# Patient Record
Sex: MALE | Race: WHITE | NOT HISPANIC OR LATINO | Employment: PART TIME | URBAN - METROPOLITAN AREA
[De-identification: names, ages, dates, MRNs, and addresses within clinical notes are randomized per-mention and may not be internally consistent; named-entity substitution may affect disease eponyms.]

---

## 2020-11-25 ENCOUNTER — HOSPITAL ENCOUNTER (OUTPATIENT)
Dept: LAB | Facility: MEDICAL CENTER | Age: 60
End: 2020-11-25
Attending: ANESTHESIOLOGY
Payer: OTHER MISCELLANEOUS

## 2020-11-25 PROCEDURE — C9803 HOPD COVID-19 SPEC COLLECT: HCPCS

## 2020-11-25 PROCEDURE — U0003 INFECTIOUS AGENT DETECTION BY NUCLEIC ACID (DNA OR RNA); SEVERE ACUTE RESPIRATORY SYNDROME CORONAVIRUS 2 (SARS-COV-2) (CORONAVIRUS DISEASE [COVID-19]), AMPLIFIED PROBE TECHNIQUE, MAKING USE OF HIGH THROUGHPUT TECHNOLOGIES AS DESCRIBED BY CMS-2020-01-R: HCPCS

## 2020-11-26 LAB — COVID ORDER STATUS COVID19: NORMAL

## 2020-11-27 LAB
SARS-COV-2 RNA RESP QL NAA+PROBE: NOTDETECTED
SPECIMEN SOURCE: NORMAL

## 2021-11-17 PROBLEM — S46.012A TRAUMATIC ROTATOR CUFF TEAR, LEFT, INITIAL ENCOUNTER: Status: ACTIVE | Noted: 2021-11-17

## 2022-01-06 ENCOUNTER — HOSPITAL ENCOUNTER (OUTPATIENT)
Facility: MEDICAL CENTER | Age: 62
End: 2022-01-06
Attending: ANESTHESIOLOGY
Payer: COMMERCIAL

## 2022-01-06 PROCEDURE — U0003 INFECTIOUS AGENT DETECTION BY NUCLEIC ACID (DNA OR RNA); SEVERE ACUTE RESPIRATORY SYNDROME CORONAVIRUS 2 (SARS-COV-2) (CORONAVIRUS DISEASE [COVID-19]), AMPLIFIED PROBE TECHNIQUE, MAKING USE OF HIGH THROUGHPUT TECHNOLOGIES AS DESCRIBED BY CMS-2020-01-R: HCPCS

## 2022-01-06 PROCEDURE — U0005 INFEC AGEN DETEC AMPLI PROBE: HCPCS

## 2022-01-07 LAB
COVID ORDER STATUS COVID19: NORMAL
SARS-COV-2 RNA RESP QL NAA+PROBE: NOTDETECTED
SPECIMEN SOURCE: NORMAL

## 2022-03-22 ENCOUNTER — TELEPHONE (OUTPATIENT)
Dept: SCHEDULING | Facility: IMAGING CENTER | Age: 62
End: 2022-03-22

## 2022-03-22 SDOH — ECONOMIC STABILITY: FOOD INSECURITY: WITHIN THE PAST 12 MONTHS, THE FOOD YOU BOUGHT JUST DIDN'T LAST AND YOU DIDN'T HAVE MONEY TO GET MORE.: NEVER TRUE

## 2022-03-22 SDOH — ECONOMIC STABILITY: INCOME INSECURITY: IN THE LAST 12 MONTHS, WAS THERE A TIME WHEN YOU WERE NOT ABLE TO PAY THE MORTGAGE OR RENT ON TIME?: NO

## 2022-03-22 SDOH — ECONOMIC STABILITY: TRANSPORTATION INSECURITY
IN THE PAST 12 MONTHS, HAS THE LACK OF TRANSPORTATION KEPT YOU FROM MEDICAL APPOINTMENTS OR FROM GETTING MEDICATIONS?: NO

## 2022-03-22 SDOH — ECONOMIC STABILITY: INCOME INSECURITY: HOW HARD IS IT FOR YOU TO PAY FOR THE VERY BASICS LIKE FOOD, HOUSING, MEDICAL CARE, AND HEATING?: NOT HARD AT ALL

## 2022-03-22 SDOH — HEALTH STABILITY: PHYSICAL HEALTH: ON AVERAGE, HOW MANY DAYS PER WEEK DO YOU ENGAGE IN MODERATE TO STRENUOUS EXERCISE (LIKE A BRISK WALK)?: 5 DAYS

## 2022-03-22 SDOH — ECONOMIC STABILITY: FOOD INSECURITY: WITHIN THE PAST 12 MONTHS, YOU WORRIED THAT YOUR FOOD WOULD RUN OUT BEFORE YOU GOT MONEY TO BUY MORE.: NEVER TRUE

## 2022-03-22 SDOH — ECONOMIC STABILITY: HOUSING INSECURITY
IN THE LAST 12 MONTHS, WAS THERE A TIME WHEN YOU DID NOT HAVE A STEADY PLACE TO SLEEP OR SLEPT IN A SHELTER (INCLUDING NOW)?: NO

## 2022-03-22 SDOH — HEALTH STABILITY: PHYSICAL HEALTH: ON AVERAGE, HOW MANY MINUTES DO YOU ENGAGE IN EXERCISE AT THIS LEVEL?: 60 MIN

## 2022-03-22 SDOH — ECONOMIC STABILITY: TRANSPORTATION INSECURITY
IN THE PAST 12 MONTHS, HAS LACK OF TRANSPORTATION KEPT YOU FROM MEETINGS, WORK, OR FROM GETTING THINGS NEEDED FOR DAILY LIVING?: NO

## 2022-03-22 SDOH — ECONOMIC STABILITY: TRANSPORTATION INSECURITY
IN THE PAST 12 MONTHS, HAS LACK OF RELIABLE TRANSPORTATION KEPT YOU FROM MEDICAL APPOINTMENTS, MEETINGS, WORK OR FROM GETTING THINGS NEEDED FOR DAILY LIVING?: NO

## 2022-03-22 SDOH — ECONOMIC STABILITY: HOUSING INSECURITY

## 2022-03-22 SDOH — HEALTH STABILITY: MENTAL HEALTH
STRESS IS WHEN SOMEONE FEELS TENSE, NERVOUS, ANXIOUS, OR CAN'T SLEEP AT NIGHT BECAUSE THEIR MIND IS TROUBLED. HOW STRESSED ARE YOU?: NOT AT ALL

## 2022-03-22 ASSESSMENT — SOCIAL DETERMINANTS OF HEALTH (SDOH)
HOW OFTEN DO YOU ATTEND CHURCH OR RELIGIOUS SERVICES?: NEVER
WITHIN THE PAST 12 MONTHS, YOU WORRIED THAT YOUR FOOD WOULD RUN OUT BEFORE YOU GOT THE MONEY TO BUY MORE: NEVER TRUE
IN A TYPICAL WEEK, HOW MANY TIMES DO YOU TALK ON THE PHONE WITH FAMILY, FRIENDS, OR NEIGHBORS?: THREE TIMES A WEEK
HOW HARD IS IT FOR YOU TO PAY FOR THE VERY BASICS LIKE FOOD, HOUSING, MEDICAL CARE, AND HEATING?: NOT HARD AT ALL
DO YOU BELONG TO ANY CLUBS OR ORGANIZATIONS SUCH AS CHURCH GROUPS UNIONS, FRATERNAL OR ATHLETIC GROUPS, OR SCHOOL GROUPS?: NO
HOW OFTEN DO YOU HAVE A DRINK CONTAINING ALCOHOL: NEVER
HOW OFTEN DO YOU GET TOGETHER WITH FRIENDS OR RELATIVES?: ONCE A WEEK
IN A TYPICAL WEEK, HOW MANY TIMES DO YOU TALK ON THE PHONE WITH FAMILY, FRIENDS, OR NEIGHBORS?: THREE TIMES A WEEK
DO YOU BELONG TO ANY CLUBS OR ORGANIZATIONS SUCH AS CHURCH GROUPS UNIONS, FRATERNAL OR ATHLETIC GROUPS, OR SCHOOL GROUPS?: NO
HOW OFTEN DO YOU GET TOGETHER WITH FRIENDS OR RELATIVES?: ONCE A WEEK
HOW OFTEN DO YOU ATTEND CHURCH OR RELIGIOUS SERVICES?: NEVER

## 2022-03-22 ASSESSMENT — LIFESTYLE VARIABLES: HOW OFTEN DO YOU HAVE A DRINK CONTAINING ALCOHOL: NEVER

## 2022-03-23 ENCOUNTER — OFFICE VISIT (OUTPATIENT)
Dept: MEDICAL GROUP | Facility: PHYSICIAN GROUP | Age: 62
End: 2022-03-23
Payer: COMMERCIAL

## 2022-03-23 VITALS
HEIGHT: 68 IN | WEIGHT: 153.6 LBS | BODY MASS INDEX: 23.28 KG/M2 | SYSTOLIC BLOOD PRESSURE: 132 MMHG | DIASTOLIC BLOOD PRESSURE: 76 MMHG | RESPIRATION RATE: 18 BRPM | OXYGEN SATURATION: 95 % | HEART RATE: 81 BPM | TEMPERATURE: 99 F

## 2022-03-23 DIAGNOSIS — Z87.898 HISTORY OF ELEVATED PROSTATE SPECIFIC ANTIGEN (PSA): ICD-10-CM

## 2022-03-23 DIAGNOSIS — J45.20 MILD INTERMITTENT ASTHMA WITHOUT COMPLICATION: ICD-10-CM

## 2022-03-23 DIAGNOSIS — R01.1 MURMUR, HEART: ICD-10-CM

## 2022-03-23 DIAGNOSIS — Z12.12 SCREENING FOR COLORECTAL CANCER: ICD-10-CM

## 2022-03-23 DIAGNOSIS — Z12.11 SCREENING FOR COLORECTAL CANCER: ICD-10-CM

## 2022-03-23 DIAGNOSIS — Z00.00 PHYSICAL EXAM, ANNUAL: ICD-10-CM

## 2022-03-23 DIAGNOSIS — J30.2 SEASONAL ALLERGIES: ICD-10-CM

## 2022-03-23 PROBLEM — K40.90 INGUINAL HERNIA: Status: ACTIVE | Noted: 2022-03-23

## 2022-03-23 PROBLEM — K40.90 INGUINAL HERNIA: Status: RESOLVED | Noted: 2022-03-23 | Resolved: 2022-03-23

## 2022-03-23 PROBLEM — Z98.890 S/P RIGHT ROTATOR CUFF REPAIR: Status: ACTIVE | Noted: 2021-11-17

## 2022-03-23 PROCEDURE — 99203 OFFICE O/P NEW LOW 30 MIN: CPT | Performed by: NURSE PRACTITIONER

## 2022-03-23 RX ORDER — CHOLECALCIFEROL (VITAMIN D3) 50 MCG
TABLET ORAL
COMMUNITY
End: 2022-03-23

## 2022-03-23 RX ORDER — CHOLECALCIFEROL (VITAMIN D3) 125 MCG
5000 CAPSULE ORAL DAILY
COMMUNITY

## 2022-03-23 RX ORDER — MULTIVIT WITH MINERALS/LUTEIN
TABLET ORAL
COMMUNITY

## 2022-03-23 ASSESSMENT — ENCOUNTER SYMPTOMS
HEADACHES: 0
DIZZINESS: 0
NAUSEA: 0
DEPRESSION: 0
EYES NEGATIVE: 1
WEIGHT LOSS: 0
MUSCULOSKELETAL NEGATIVE: 1
DIARRHEA: 0
ABDOMINAL PAIN: 0
FEVER: 0
CHILLS: 0
CONSTIPATION: 0
WEAKNESS: 0
SHORTNESS OF BREATH: 0
COUGH: 0
VOMITING: 0
BLOOD IN STOOL: 0
PALPITATIONS: 0

## 2022-03-23 ASSESSMENT — PATIENT HEALTH QUESTIONNAIRE - PHQ9: CLINICAL INTERPRETATION OF PHQ2 SCORE: 0

## 2022-03-23 NOTE — ASSESSMENT & PLAN NOTE
Previously had been high  Started taking herbal supplement [employer]   States it brought PSA down by 3 points  Will get new labs  Urinates 1-2 times at night

## 2022-03-23 NOTE — ASSESSMENT & PLAN NOTE
Chronic and stable condition   Was being followed by Johan Rivas many years ago   Denies lightheadedness, dizziness, SOB

## 2022-03-23 NOTE — ASSESSMENT & PLAN NOTE
Acute and uncomplicated condition   Surgery in January   Recent checkup with [facility] February  Works as landscape maintenance during the season  Continues with Physical therapy   Not taking any medications for this

## 2022-03-23 NOTE — PROGRESS NOTES
Chief Complaint   Patient presents with   • Establish Care      Subjective:     Darren Gannon is a 61 y.o. 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  Continues with Physical therapy   Not taking any medications for this        Mild intermittent asthma  Chronic and stable condition  Bronchial asthma- when he gets a cold it gets worse  Has albuterol inhaler as needed   No recent flare ups  No recent prednisone use      Seasonal allergies  Chronic and stable condition   Noticed more with pellet stove at times  Essential oils for allergies  Uses herbal asthma supplement - [employer]  Phytocort    History of elevated prostate specific antigen (PSA)  Previously had been high  Started taking herbal supplement [employer]   States it brought PSA down by 3 points  Will get new labs  Urinates 1-2 times at night    Murmur, heart  Chronic and stable condition   Was being followed by Johan Rivas many years ago   Denies lightheadedness, dizziness, SOB       Review of Systems   Constitutional: Negative for chills, fever, malaise/fatigue and weight loss.   HENT: Negative.    Eyes: Negative.    Respiratory: Negative for cough and shortness of breath.    Cardiovascular: Negative for chest pain and palpitations.   Gastrointestinal: Negative for abdominal pain, blood in stool, constipation, diarrhea, nausea and vomiting.   Genitourinary: Negative.    Musculoskeletal: Negative.    Skin: Negative.    Neurological: Negative for dizziness, weakness and headaches.   Psychiatric/Behavioral: Negative for depression and suicidal ideas.            Current Outpatient Medications:   •  Ascorbic Acid (VITAMIN C) 1000 MG Tab, 1 tab(s), Disp: , Rfl:   •  ASTAXANTHIN PO, Take 12 mg by mouth., Disp: , Rfl:   •  cholecalciferol (D-3-5) 5000 UNIT Cap, Take  5,000 Units by mouth every day., Disp: , Rfl:   •  MAGNESIUM GLYCINATE PO, Take 250 mg by mouth., Disp: , Rfl:   •  Zinc Acetate-Sweetleaf (FP ZINC LOZENGES PO), Take 23 mg by mouth., Disp: , Rfl:   •  B Complex Vitamins (B COMPLEX PO), Take  by mouth., Disp: , Rfl:   •  albuterol (VENTOLIN OR PROVENTIL) 108 (90 BASE) MCG/ACT AERS, Inhale 2 Puffs by mouth every 6 hours as needed for Shortness of Breath., Disp: 8.5 g, Rfl: 3    Past Medical History:   Diagnosis Date   • Allergic rhinitis    • ASTHMA    • Inguinal hernia 3/23/2022       Past Surgical History:   Procedure Laterality Date   • PB SHLDR ARTHROSCOP,SURG,W/ROTAT CUFF REPB Left 1/12/2022    Procedure: LEFT SHOULDER ARTHROSCOPY ROTATOR CUFF REPAIR, LEFT SUBSCAPULARIS REPAIR;  Surgeon: Sarahi Geller M.D.;  Location: [facility];  Service: Orthopedics   • PB SHLDR ARTHROSCOP,PART ACROMIOPLAS Left 1/12/2022    Procedure: LEFT SUBACROMIAL DECOMPRESSION, LABRAL DEBRIDEMENT;  Surgeon: Sarahi Geller M.D.;  Location: [facility];  Service: Orthopedics   • INGUINAL HERNIA REPAIR Right 11/2021   • HIP ARTHROPLASTY TOTAL Left 2020   • KNEE ARTHROSCOPY Right 2007    meniscus repair   • INGUINAL HERNIA REPAIR Left 2002   • KNEE ARTHROSCOPY Left    • VASECTOMY         Family History   Problem Relation Age of Onset   • Ovarian Cancer Mother    • Heart Disease Father    • Heart Attack Father    • Diabetes Sister    • Hypertension Sister    • Stroke Maternal Aunt    • Alcohol abuse Paternal Uncle    • Hypertension Sister    • Hypertension Sister    • Alcohol abuse Paternal Uncle    • Heart Disease Paternal Uncle    • Heart Attack Paternal Uncle    • Heart Disease Paternal Uncle    • Heart Attack Paternal Uncle    • Heart Disease Paternal Uncle    • Aortic dissection Maternal Grandfather    • Heart Attack Paternal Grandmother    • GI Disease Daughter         uc   • Other Daughter         DI   • No Known Problems Daughter   "      Penicillins    Allergies, past medical history, past surgical history, family history, social history reviewed and updated    Objective:     Vitals: /76   Pulse 81   Temp 37.2 °C (99 °F) (Temporal)   Resp 18   Ht 1.727 m (5' 8\")   Wt 69.7 kg (153 lb 9.6 oz)   SpO2 95%   BMI 23.35 kg/m²   General: Alert, pleasant, NAD  EYES:   PERRL, EOMI, no icterus or pallor.  Conjunctivae and lids normal.   HENT:  Normocephalic.  External ears normal. Tympanic membranes pearly, opaque.  No nasal drainage present.  Oropharynx non-erythematous, mucous membranes moist.  Neck supple.   No thyromegaly or masses palpated. No cervical or supraclavicular lymphadenopathy.  Heart: Regular rate and rhythm.  S1 and S2 normal.  Murmur appreciated.  Respiratory: Normal respiratory effort.  Clear to auscultation bilaterally.  Abdomen: Non-distended, soft, non-tender, no guarding/rebound. Bowel sounds present.  No hepatosplenomegaly.  No hernias.  Skin: Warm, dry, no rashes.  Musculoskeletal: Gait is normal.  Moves all extremities well.    Extremities: No clubbing, cyanosis or edema noted.   Neurological: No tremors, sensation grossly intact, tone/strength normal, gait is normal, CN2-12 intact.  Psych:  Affect/mood is normal, judgement is good, memory is intact, grooming is appropriate.    Assessment/Plan:     1. Physical exam, annual  - CBC WITH DIFFERENTIAL; Future  - Comp Metabolic Panel; Future  - Lipid Profile; Future  - PROSTATE SPECIFIC AG SCREENING; Future    2. Mild intermittent asthma without complication  - CBC WITH DIFFERENTIAL; Future    3. Seasonal allergies  - CBC WITH DIFFERENTIAL; Future    4. History of elevated prostate specific antigen (PSA)  - PROSTATE SPECIFIC AG SCREENING; Future    5. Screening for colorectal cancer  - Referral to GI for Colonoscopy    6. Murmur, heart  - EC-ECHOCARDIOGRAM COMPLETE W/O CONT; Future    Discussed with patient possible alternative diagnoses, patient is to take all " medications as prescribed.     If symptoms persist FU w/PCP, if symptoms worsen go to emergency room.     If experiencing any side effects from prescribed medications reports to the office immediately or go to emergency room.    Reviewed indication, dosage, usage and potential adverse effects of prescribed medications.     Reviewed risks and benefits of treatment plan. Patient verbalizes understanding of all instruction and verbally agrees to plan.    Discussed plan with the patient, and she agrees to the above.      I personally reviewed prior external notes and test results pertinent to today's visit.        Return for pending labs and imaging.    My total time spent caring for the patient on the day of the encounter was 30 minutes.   This does not include time spent on separately billable procedures/tests.     Please note that this dictation was created using voice recognition software. I have made every reasonable attempt to correct obvious errors, but I expect that there may be errors of grammar and possibly content that I did not discover before finalizing the note.

## 2022-03-23 NOTE — ASSESSMENT & PLAN NOTE
Chronic and stable condition  Bronchial asthma- when he gets a cold it gets worse  Has albuterol inhaler as needed   No recent flare ups  No recent prednisone use

## 2022-03-23 NOTE — ASSESSMENT & PLAN NOTE
Chronic and stable condition   Noticed more with pellet stove at times  Essential oils for allergies  Uses herbal asthma supplement - [employer]  Phytocort

## 2022-03-25 ENCOUNTER — PATIENT MESSAGE (OUTPATIENT)
Dept: MEDICAL GROUP | Facility: PHYSICIAN GROUP | Age: 62
End: 2022-03-25
Payer: COMMERCIAL

## 2022-03-25 DIAGNOSIS — Z12.12 SCREENING FOR COLORECTAL CANCER: ICD-10-CM

## 2022-03-25 DIAGNOSIS — Z12.11 SCREENING FOR COLORECTAL CANCER: ICD-10-CM

## 2022-04-15 ENCOUNTER — OFFICE VISIT (OUTPATIENT)
Dept: MEDICAL GROUP | Facility: PHYSICIAN GROUP | Age: 62
End: 2022-04-15
Payer: COMMERCIAL

## 2022-04-15 VITALS
HEART RATE: 78 BPM | RESPIRATION RATE: 14 BRPM | OXYGEN SATURATION: 94 % | HEIGHT: 68 IN | SYSTOLIC BLOOD PRESSURE: 126 MMHG | BODY MASS INDEX: 23.04 KG/M2 | DIASTOLIC BLOOD PRESSURE: 80 MMHG | WEIGHT: 152 LBS | TEMPERATURE: 98.9 F

## 2022-04-15 DIAGNOSIS — R73.01 ELEVATED FASTING GLUCOSE: ICD-10-CM

## 2022-04-15 DIAGNOSIS — R97.20 ELEVATED PSA, LESS THAN 10 NG/ML: ICD-10-CM

## 2022-04-15 PROCEDURE — 99214 OFFICE O/P EST MOD 30 MIN: CPT | Performed by: NURSE PRACTITIONER

## 2022-04-15 NOTE — ASSESSMENT & PLAN NOTE
Patient has a history of elevated glucose in the past.  Labs on 4/4/2022 showed a glucose level of 108.  He does have a family history with his sister being diabetic however notes that his sister is overweight.

## 2022-04-15 NOTE — ASSESSMENT & PLAN NOTE
Patient completed lab work on 4/4/2022.  It was noted that he did have an elevated PSA of 8.2.  Patient does have a history of an elevated PSA a few years ago which resulted at 6.6 he was then seen at urology with a provider and was told to get a biopsy at that time.  Unfortunately for patient he did not have a good experience with that provider and chose not to get the biopsy completed.  He did take a supplement which did lower his PSA down to 4.2 and felt that that was manageable.  Patient then did not get further follow-ups due to Covid.  He denies any current issues with urination, difficulty starting or stopping, intermittent stream or low stream.  He also saw his functional medicine provider who did a JULIANNE on him after labs were reviewed and she noted that there was nothing noted during the JULIANNE however did also recommend that he go to a urologist.  educating and discussing with patient and wife we will go ahead and order a urology referral.

## 2022-04-15 NOTE — PROGRESS NOTES
CC:  Chief Complaint   Patient presents with   • Lab Results       HISTORY OF PRESENT ILLNESS: Patient is a 61 y.o. male established patient presenting for lab review    1. Elevated PSA, less than 10 ng/mg  Chronic and exacerbated condition  Patient states that he has had a previous elevation a few years ago however never completed a biopsy.  Denies any issues with his stream, frequency, urgency, dribbling.  He does currently take a supplement for BPH    2. Elevated fasting glucose  Chronic and stable condition  Denies any polyuria polydipsia polyphagia or any hyper or hypoglycemic events  We will continue to monitor         Allergies:Penicillins    Current Outpatient Medications   Medication Sig Dispense Refill   • cod liver oil Cap      • Ascorbic Acid (VITAMIN C) 1000 MG Tab 1 tab(s)     • ASTAXANTHIN PO Take 12 mg by mouth.     • cholecalciferol (D-3-5) 5000 UNIT Cap Take 5,000 Units by mouth every day.     • MAGNESIUM GLYCINATE PO Take 250 mg by mouth.     • Zinc Acetate-Sweetleaf (FP ZINC LOZENGES PO) Take 23 mg by mouth.     • B Complex Vitamins (B COMPLEX PO) Take  by mouth.     • albuterol (VENTOLIN OR PROVENTIL) 108 (90 BASE) MCG/ACT AERS Inhale 2 Puffs by mouth every 6 hours as needed for Shortness of Breath. 8.5 g 3     No current facility-administered medications for this visit.       Social History     Tobacco Use   • Smoking status: Former Smoker   • Smokeless tobacco: Never Used   Vaping Use   • Vaping Use: Never used   Substance Use Topics   • Alcohol use: No   • Drug use: No     Social History     Social History Narrative   • Not on file       Family History   Problem Relation Age of Onset   • Ovarian Cancer Mother    • Heart Disease Father    • Heart Attack Father    • Diabetes Sister    • Hypertension Sister    • Stroke Maternal Aunt    • Alcohol abuse Paternal Uncle    • Hypertension Sister    • Hypertension Sister    • Alcohol abuse Paternal Uncle    • Heart Disease Paternal Uncle    • Heart  "Attack Paternal Uncle    • Heart Disease Paternal Uncle    • Heart Attack Paternal Uncle    • Heart Disease Paternal Uncle    • Aortic dissection Maternal Grandfather    • Heart Attack Paternal Grandmother    • GI Disease Daughter         uc   • Other Daughter         DI   • No Known Problems Daughter         ROS   See HPI      - NOTE: All other systems reviewed and are negative, except as in HPI.      Exam:    /80   Pulse 78   Temp 37.2 °C (98.9 °F) (Temporal)   Resp 14   Ht 1.727 m (5' 8\")   Wt 68.9 kg (152 lb)   SpO2 94%  Body mass index is 23.11 kg/m².    General: Alert, pleasant, NAD  EYES:   PERRL, EOMI, no icterus or pallor.  Conjunctivae and lids normal.   HENT:  Normocephalic.  External ears normal.   Respiratory: Normal respiratory effort.    Psych:  Affect/mood is normal, judgement is good, memory is intact, grooming is appropriate.      LABS: 4/4/2022: Results reviewed and discussed with the patient, questions answered.    Assessment/Plan:  Elevated PSA, less than 10 ng/ml  Patient completed lab work on 4/4/2022.  It was noted that he did have an elevated PSA of 8.2.  Patient does have a history of an elevated PSA a few years ago which resulted at 6.6 he was then seen at urology with a provider and was told to get a biopsy at that time.  Unfortunately for patient he did not have a good experience with that provider and chose not to get the biopsy completed.  He did take a supplement which did lower his PSA down to 4.2 and felt that that was manageable.  Patient then did not get further follow-ups due to Covid.  He denies any current issues with urination, difficulty starting or stopping, intermittent stream or low stream.  He also saw his functional medicine provider who did a JULIANNE on him after labs were reviewed and she noted that there was nothing noted during the JULIANNE however did also recommend that he go to a urologist.  educating and discussing with patient and wife we will go ahead and " order a urology referral.    Elevated fasting glucose  Patient has a history of elevated glucose in the past.  Labs on 4/4/2022 showed a glucose level of 108.  He does have a family history with his sister being diabetic however notes that his sister is overweight.     1. Elevated PSA, less than 10 ng/ml  Referral to Urology   2. Elevated fasting glucose       Discussed with patient possible alternative diagnoses, patient is to take all medications as prescribed.     If symptoms persist FU w/PCP, if symptoms worsen go to emergency room.     If experiencing any side effects from prescribed medications reports to the office immediately or go to emergency room.    Reviewed indication, dosage, usage and potential adverse effects of prescribed medications.     Reviewed risks and benefits of treatment plan. Patient verbalizes understanding of all instruction and verbally agrees to plan.      No follow-ups on file.     Please note that this dictation was created using voice recognition software. I have made every reasonable attempt to correct obvious errors, but I expect that there are errors of grammar and possibly content that I did not discover before finalizing the note.

## 2022-08-17 ENCOUNTER — PATIENT MESSAGE (OUTPATIENT)
Dept: MEDICAL GROUP | Facility: PHYSICIAN GROUP | Age: 62
End: 2022-08-17
Payer: COMMERCIAL

## 2022-08-17 ENCOUNTER — OFFICE VISIT (OUTPATIENT)
Dept: MEDICAL GROUP | Facility: PHYSICIAN GROUP | Age: 62
End: 2022-08-17
Payer: COMMERCIAL

## 2022-08-17 VITALS
TEMPERATURE: 97.9 F | HEART RATE: 60 BPM | SYSTOLIC BLOOD PRESSURE: 128 MMHG | BODY MASS INDEX: 22.64 KG/M2 | WEIGHT: 149.4 LBS | OXYGEN SATURATION: 96 % | RESPIRATION RATE: 16 BRPM | HEIGHT: 68 IN | DIASTOLIC BLOOD PRESSURE: 82 MMHG

## 2022-08-17 DIAGNOSIS — F41.9 ANXIETY: ICD-10-CM

## 2022-08-17 DIAGNOSIS — J45.909 MILD ASTHMA, UNSPECIFIED WHETHER COMPLICATED, UNSPECIFIED WHETHER PERSISTENT: ICD-10-CM

## 2022-08-17 PROCEDURE — 99214 OFFICE O/P EST MOD 30 MIN: CPT | Performed by: NURSE PRACTITIONER

## 2022-08-17 RX ORDER — ALBUTEROL SULFATE 90 UG/1
2 AEROSOL, METERED RESPIRATORY (INHALATION) EVERY 6 HOURS PRN
Qty: 8.5 G | Refills: 3 | Status: SHIPPED | OUTPATIENT
Start: 2022-08-17

## 2022-08-17 RX ORDER — FINASTERIDE 5 MG/1
5 TABLET, FILM COATED ORAL DAILY
COMMUNITY
Start: 2022-06-21

## 2022-08-17 RX ORDER — SCOLOPAMINE TRANSDERMAL SYSTEM 1 MG/1
1 PATCH, EXTENDED RELEASE TRANSDERMAL
Qty: 4 PATCH | Refills: 1 | Status: SHIPPED | OUTPATIENT
Start: 2022-08-17

## 2022-08-17 RX ORDER — ALPRAZOLAM 0.5 MG/1
0.5 TABLET ORAL NIGHTLY PRN
Qty: 4 TABLET | Refills: 0 | Status: SHIPPED | OUTPATIENT
Start: 2022-08-17 | End: 2022-09-16

## 2022-08-17 NOTE — PROGRESS NOTES
CC:  Chief Complaint   Patient presents with    Other     Requesting xanax        HISTORY OF PRESENT ILLNESS: Patient is a 62 y.o. male established patient presenting medications for anxiety for flying and seasickness as well as refills for albuterol    Anxiety  Chronic and stable condition  Patient is planning a trip  to [place] and has some anxiety about flying.  He is requesting a prescription for Xanax for the flight.    He also notes that he gets sea sick with cruises which is what he will be on and requests a scopolamine patch.        Allergies:Penicillins    Current Outpatient Medications   Medication Sig Dispense Refill    finasteride (PROSCAR) 5 MG Tab Take 5 mg by mouth every day.      ALPRAZolam (XANAX) 0.5 MG Tab Take 1 Tablet by mouth at bedtime as needed for Anxiety (take 1 tablet 30 minutes before takeoff and one if needed at take off) for up to 30 days. 4 Tablet 0    scopolamine (TRANSDERM-SCOP, 1.5 MG,) 1 mg/72hr PATCH 72 HR Place 1 Patch on the skin every 72 hours. 4 Patch 1    albuterol 108 (90 Base) MCG/ACT Aero Soln inhalation aerosol Inhale 2 Puffs every 6 hours as needed for Shortness of Breath. 8.5 g 3    cod liver oil Cap       Ascorbic Acid (VITAMIN C) 1000 MG Tab 1 tab(s)      ASTAXANTHIN PO Take 12 mg by mouth.      cholecalciferol (D-3-5) 5000 UNIT Cap Take 5,000 Units by mouth every day.      MAGNESIUM GLYCINATE PO Take 250 mg by mouth.      Zinc Acetate-Sweetleaf (FP ZINC LOZENGES PO) Take 23 mg by mouth.      B Complex Vitamins (B COMPLEX PO) Take  by mouth.       No current facility-administered medications for this visit.     Past Medical History:   Diagnosis Date    Allergic rhinitis     ASTHMA     Inguinal hernia 3/23/2022     Past Surgical History:   Procedure Laterality Date    PB SHLDR ARTHROSCOP,SURG,W/ROTAT CUFF REPB Left 1/12/2022    Procedure: LEFT SHOULDER ARTHROSCOPY ROTATOR CUFF REPAIR, LEFT SUBSCAPULARIS REPAIR;  Surgeon: Sarahi Geller M.D.;  Location: [facility];  Service: Orthopedics    ND SHLDR ARTHROSCOP,PART ACROMIOPLAS Left 1/12/2022    Procedure: LEFT SUBACROMIAL DECOMPRESSION, LABRAL DEBRIDEMENT;  Surgeon: Sarahi Geller M.D.;  Location: [facility];  Service: Orthopedics    INGUINAL HERNIA REPAIR Right 11/2021    HIP ARTHROPLASTY TOTAL Left 2020    KNEE ARTHROSCOPY Right 2007    meniscus repair    INGUINAL HERNIA REPAIR Left 2002    KNEE ARTHROSCOPY Left     VASECTOMY       Social History     Tobacco Use    Smoking status: Former    Smokeless tobacco: Never   Vaping Use    Vaping Use: Never used   Substance Use Topics    Alcohol use: No    Drug use: No     Social History     Social History Narrative    Not on file       Family History   Problem Relation Age of Onset    Ovarian Cancer Mother     Heart Disease Father     Heart Attack Father     Diabetes Sister     Hypertension Sister     Stroke Maternal Aunt     Alcohol abuse Paternal Uncle     Hypertension Sister     Hypertension Sister     Alcohol abuse Paternal Uncle     Heart Disease Paternal Uncle     Heart Attack Paternal Uncle     Heart Disease Paternal Uncle     Heart Attack Paternal Uncle     Heart Disease Paternal Uncle     Aortic dissection Maternal Grandfather     Heart Attack Paternal Grandmother     GI Disease Daughter         uc    Other Daughter         DI    No Known Problems Daughter         ROS    see hpi      - NOTE: All other systems reviewed and are negative, except as in HPI.      Exam:    /82   Pulse 60   Temp 36.6 °C (97.9 °F) (Temporal)   Resp 16   Ht 1.727 m (5' 8\")   Wt 67.8 kg (149 lb 6.4 oz)   SpO2 96%  Body mass index is 22.72 kg/m².    General: Alert, pleasant, NAD  EYES:   PERRL, EOMI, no icterus or pallor.  Conjunctivae and lids normal.   HENT:  Normocephalic.  External ears normal.   Heart: Regular rate and rhythm.  S1 and S2 normal.  murmurs appreciated.  Respiratory: Normal respiratory effort.  Clear to auscultation " bilaterally.  Skin: Warm, dry, no rashes.  Musculoskeletal: Gait is normal.  Moves all extremities well.    Extremities: No clubbing, cyanosis or edema noted.   Neurological: No tremors, sensation grossly intact, tone/strength normal, gait is normal.  Psych:  Affect/mood is normal, judgement is good, memory is intact, grooming is appropriate.      Assessment/Plan:     1. Anxiety  - ALPRAZolam (XANAX) 0.5 MG Tab; Take 1 Tablet by mouth at bedtime as needed for Anxiety (take 1 tablet 30 minutes before takeoff and one if needed at take off) for up to 30 days.  Dispense: 4 Tablet; Refill: 0  - scopolamine (TRANSDERM-SCOP, 1.5 MG,) 1 mg/72hr PATCH 72 HR; Place 1 Patch on the skin every 72 hours.  Dispense: 4 Patch; Refill: 1    2. Mild asthma, unspecified whether complicated, unspecified whether persistent  - albuterol 108 (90 Base) MCG/ACT Aero Soln inhalation aerosol; Inhale 2 Puffs every 6 hours as needed for Shortness of Breath.  Dispense: 8.5 g; Refill: 3     Discussed with patient possible alternative diagnoses, patient is to take all medications as prescribed.     If symptoms persist FU w/PCP, if symptoms worsen go to emergency room.     If experiencing any side effects from prescribed medications reports to the office immediately or go to emergency room.    Reviewed indication, dosage, usage and potential adverse effects of prescribed medications.     Reviewed risks and benefits of treatment plan. Patient verbalizes understanding of all instruction and verbally agrees to plan.      Return in about 8 months (around 4/17/2023) for follow up annual physical.      Please note that this dictation was created using voice recognition software. I have made every reasonable attempt to correct obvious errors, but I expect that there are errors of grammar and possibly content that I did not discover before finalizing the note.

## 2022-08-17 NOTE — ASSESSMENT & PLAN NOTE
Chronic and stable condition  Patient is planning a trip  to [place] and has some anxiety about flying.  He is requesting a prescription for Xanax for the flight.    He also notes that he gets sea sick with cruises which is what he will be on and requests a scopolamine patch.

## 2022-09-20 ENCOUNTER — TELEMEDICINE (OUTPATIENT)
Dept: MEDICAL GROUP | Facility: PHYSICIAN GROUP | Age: 62
End: 2022-09-20
Payer: COMMERCIAL

## 2022-09-20 VITALS
WEIGHT: 149 LBS | BODY MASS INDEX: 22.58 KG/M2 | TEMPERATURE: 98.2 F | OXYGEN SATURATION: 97 % | HEART RATE: 51 BPM | HEIGHT: 68 IN

## 2022-09-20 DIAGNOSIS — U07.1 COVID: ICD-10-CM

## 2022-09-20 PROCEDURE — 99214 OFFICE O/P EST MOD 30 MIN: CPT | Mod: 95 | Performed by: NURSE PRACTITIONER

## 2022-09-20 NOTE — PROGRESS NOTES
Virtual Visit: Established Patient   This visit was conducted via Zoom using secure and encrypted videoconferencing technology.   The patient was in their home in the state [facility].    The patient's identity was confirmed and verbal consent was obtained for this virtual visit.     Subjective:   CC:   Chief Complaint   Patient presents with    Coronavirus Screening     Tested positive 9/20/2022;  congestion, sinus pressure, chills, body ache, fatigue       Darren Gannon is a 62 y.o. male presenting for evaluation and management of:    COVID  Acute and uncomplicated   Onset Saturday  Recently on cruise  Fevers and chills yesterday   History of asthma which he states he has used a little but not a ton.   Denies exacerbations      ROS   See HPI    Current medicines (including changes today)  Current Outpatient Medications   Medication Sig Dispense Refill    Nirmatrelvir&Ritonavir 300/100 20 x 150 MG & 10 x 100MG Tablet Therapy Pack Take 300 mg nirmatrelvir (two 150 mg tablets) with 100 mg ritonavir (one 100 mg tablet) by mouth, with all three tablets taken together twice daily for 5 days. 30 Each 0    finasteride (PROSCAR) 5 MG Tab Take 5 mg by mouth every day.      albuterol 108 (90 Base) MCG/ACT Aero Soln inhalation aerosol Inhale 2 Puffs every 6 hours as needed for Shortness of Breath. 8.5 g 3    cod liver oil Cap       Ascorbic Acid (VITAMIN C) 1000 MG Tab 1 tab(s)      ASTAXANTHIN PO Take 12 mg by mouth.      cholecalciferol (D-3-5) 5000 UNIT Cap Take 5,000 Units by mouth every day.      MAGNESIUM GLYCINATE PO Take 250 mg by mouth.      Zinc Acetate-Sweetleaf (FP ZINC LOZENGES PO) Take 23 mg by mouth.      B Complex Vitamins (B COMPLEX PO) Take  by mouth.      scopolamine (TRANSDERM-SCOP, 1.5 MG,) 1 mg/72hr PATCH 72 HR Place 1 Patch on the skin every 72 hours. (Patient not taking: Reported on 9/20/2022) 4 Patch 1     No current facility-administered medications for this visit.       Patient Active Problem List     "Diagnosis Date Noted    COVID 09/20/2022    Anxiety 08/17/2022    Elevated PSA, less than 10 ng/ml 04/15/2022    Elevated fasting glucose 04/15/2022    History of elevated prostate specific antigen (PSA) 03/23/2022    Murmur, heart 03/23/2022    S/P right rotator cuff repair 11/17/2021    Seasonal allergies     Mild intermittent asthma         Objective:   Pulse (!) 51   Temp 36.8 °C (98.2 °F) (Temporal)   Ht 1.727 m (5' 8\")   Wt 67.6 kg (149 lb)   SpO2 97%   BMI 22.66 kg/m²     Physical Exam:  Constitutional: Alert, no distress, well-groomed.  Skin: No rashes in visible areas.  Eye: Round. Conjunctiva clear, lids normal. No icterus.   ENMT: Lips pink without lesions, good dentition, moist mucous membranes. Phonation normal.  Neck: No masses, no thyromegaly. Moves freely without pain.  Respiratory: Unlabored respiratory effort, no cough or audible wheeze  Psych: Alert and oriented x3, normal affect and mood.     Assessment and Plan:   The following treatment plan was discussed:     1. COVID  - Nirmatrelvir&Ritonavir 300/100 20 x 150 MG & 10 x 100MG Tablet Therapy Pack; Take 300 mg nirmatrelvir (two 150 mg tablets) with 100 mg ritonavir (one 100 mg tablet) by mouth, with all three tablets taken together twice daily for 5 days.  Dispense: 30 Each; Refill: 0      Follow-up: Return in about 1 week (around 9/27/2022) for for worsening of symptoms.          "

## 2022-09-20 NOTE — ASSESSMENT & PLAN NOTE
Acute and uncomplicated   Onset Saturday  Recently on cruise  Fevers and chills yesterday   History of asthma which he states he has used a little but not a ton.   Denies exacerbations

## 2022-11-02 DIAGNOSIS — I34.0 MODERATE MITRAL VALVE REGURGITATION: ICD-10-CM

## 2023-03-24 ENCOUNTER — APPOINTMENT (OUTPATIENT)
Dept: MEDICAL GROUP | Facility: PHYSICIAN GROUP | Age: 63
End: 2023-03-24
Payer: COMMERCIAL

## 2023-03-28 ENCOUNTER — APPOINTMENT (OUTPATIENT)
Dept: MEDICAL GROUP | Facility: PHYSICIAN GROUP | Age: 63
End: 2023-03-28
Payer: COMMERCIAL

## 2023-03-31 ENCOUNTER — OFFICE VISIT (OUTPATIENT)
Dept: MEDICAL GROUP | Facility: PHYSICIAN GROUP | Age: 63
End: 2023-03-31
Payer: COMMERCIAL

## 2023-03-31 VITALS
HEIGHT: 68 IN | DIASTOLIC BLOOD PRESSURE: 70 MMHG | WEIGHT: 152 LBS | HEART RATE: 80 BPM | OXYGEN SATURATION: 93 % | RESPIRATION RATE: 12 BRPM | TEMPERATURE: 97.7 F | SYSTOLIC BLOOD PRESSURE: 130 MMHG | BODY MASS INDEX: 23.04 KG/M2

## 2023-03-31 DIAGNOSIS — Z13.220 SCREENING FOR LIPID DISORDERS: ICD-10-CM

## 2023-03-31 DIAGNOSIS — J45.20 MILD INTERMITTENT ASTHMA WITHOUT COMPLICATION: ICD-10-CM

## 2023-03-31 DIAGNOSIS — R73.01 ELEVATED FASTING GLUCOSE: ICD-10-CM

## 2023-03-31 DIAGNOSIS — F41.9 ANXIETY: ICD-10-CM

## 2023-03-31 DIAGNOSIS — Z87.898 HISTORY OF ELEVATED PROSTATE SPECIFIC ANTIGEN (PSA): ICD-10-CM

## 2023-03-31 PROCEDURE — 99214 OFFICE O/P EST MOD 30 MIN: CPT | Performed by: NURSE PRACTITIONER

## 2023-03-31 RX ORDER — ALPRAZOLAM 0.5 MG/1
0.5 TABLET ORAL
Qty: 10 TABLET | Refills: 0 | Status: SHIPPED | OUTPATIENT
Start: 2023-03-31 | End: 2023-04-30

## 2023-03-31 ASSESSMENT — ENCOUNTER SYMPTOMS
DEPRESSION: 0
INSOMNIA: 0
CHILLS: 0
NERVOUS/ANXIOUS: 1
SHORTNESS OF BREATH: 0
FEVER: 0

## 2023-03-31 ASSESSMENT — LIFESTYLE VARIABLES: SUBSTANCE_ABUSE: 0

## 2023-03-31 ASSESSMENT — PATIENT HEALTH QUESTIONNAIRE - PHQ9: CLINICAL INTERPRETATION OF PHQ2 SCORE: 0

## 2023-03-31 ASSESSMENT — FIBROSIS 4 INDEX: FIB4 SCORE: 0.88

## 2023-03-31 NOTE — PROGRESS NOTES
"CC:  Chief Complaint   Patient presents with    Medication Refill     ALPRAZolam       HISTORY OF PRESENT ILLNESS: Patient is a 62 y.o. male established patient presenting     Problem   Anxiety    Patient and wife are planning to fly to [place] in the next 2 weeks and he gets very anxious about flying.  In the past we have prescribed him Xanax for his sleep which he has done well on.  He is currently requesting a refill for his Xanax so he can fly.  PDMP reviewed           Review of Systems   Constitutional:  Negative for chills and fever.   Respiratory:  Negative for shortness of breath.    Cardiovascular:  Negative for chest pain.   Psychiatric/Behavioral:  Negative for depression and substance abuse. The patient is nervous/anxious. The patient does not have insomnia.            - NOTE: All other systems reviewed and are negative, except as in HPI.      Exam:    /70 (BP Location: Left arm, Patient Position: Sitting, BP Cuff Size: Adult)   Pulse 80   Temp 36.5 °C (97.7 °F) (Temporal)   Resp 12   Ht 1.727 m (5' 8\")   Wt 68.9 kg (152 lb)   SpO2 93%  Body mass index is 23.11 kg/m².    Physical Exam  Constitutional:       Appearance: Normal appearance. He is normal weight.   HENT:      Head: Normocephalic and atraumatic.   Pulmonary:      Effort: Pulmonary effort is normal.   Neurological:      Mental Status: He is alert.   Psychiatric:         Mood and Affect: Mood normal.         Behavior: Behavior normal.         Thought Content: Thought content normal.         Judgment: Judgment normal.         Assessment/Plan:    1. Anxiety  Chronic and stable condition   - ALPRAZolam (XANAX) 0.5 MG Tab; Take 1 Tablet by mouth one time as needed for Anxiety (flying) for up to 10 doses.  Dispense: 10 Tablet; Refill: 0    2. Elevated fasting glucose  Chronic and stable condition   - Comp Metabolic Panel; Future    3. History of elevated prostate specific antigen (PSA)  Chronic and stable condition   Follows with " urology    4. Mild intermittent asthma without complication  Chronic and stable condition   - CBC WITHOUT DIFFERENTIAL; Future    5. Screening for lipid disorders  - Lipid Profile; Future        Discussed with patient possible alternative diagnoses, patient is to take all medications as prescribed.     If symptoms persist FU w/PCP, if symptoms worsen go to emergency room.     If experiencing any side effects from prescribed medications reports to the office immediately or go to emergency room.    Reviewed indication, dosage, usage and potential adverse effects of prescribed medications.     Reviewed risks and benefits of treatment plan. Patient verbalizes understanding of all instruction and verbally agrees to plan.      Return for pending labs.      Please note that this dictation was created using voice recognition software. I have made every reasonable attempt to correct obvious errors, but I expect that there are errors of grammar and possibly content that I did not discover before finalizing the note.

## 2024-02-09 DIAGNOSIS — L98.9 SKIN LESION: ICD-10-CM

## 2024-04-26 DIAGNOSIS — R97.20 ELEVATED PSA, LESS THAN 10 NG/ML: ICD-10-CM
